# Patient Record
Sex: MALE | Race: WHITE | HISPANIC OR LATINO | URBAN - METROPOLITAN AREA
[De-identification: names, ages, dates, MRNs, and addresses within clinical notes are randomized per-mention and may not be internally consistent; named-entity substitution may affect disease eponyms.]

---

## 2024-12-20 ENCOUNTER — LAB (OUTPATIENT)
Dept: LAB | Facility: LAB | Age: 24
End: 2024-12-20
Payer: COMMERCIAL

## 2024-12-20 ENCOUNTER — APPOINTMENT (OUTPATIENT)
Dept: PRIMARY CARE | Facility: CLINIC | Age: 24
End: 2024-12-20
Payer: COMMERCIAL

## 2024-12-20 VITALS
SYSTOLIC BLOOD PRESSURE: 124 MMHG | DIASTOLIC BLOOD PRESSURE: 68 MMHG | WEIGHT: 151 LBS | HEART RATE: 71 BPM | BODY MASS INDEX: 17.47 KG/M2 | HEIGHT: 78 IN

## 2024-12-20 DIAGNOSIS — K58.0 IRRITABLE BOWEL SYNDROME WITH DIARRHEA: Primary | ICD-10-CM

## 2024-12-20 DIAGNOSIS — Z23 NEED FOR INFLUENZA VACCINATION: ICD-10-CM

## 2024-12-20 DIAGNOSIS — R29.91 MARFANOID HABITUS: ICD-10-CM

## 2024-12-20 PROBLEM — R06.02 SHORTNESS OF BREATH: Status: RESOLVED | Noted: 2024-12-20 | Resolved: 2024-12-20

## 2024-12-20 PROBLEM — R05.9 COUGH: Status: ACTIVE | Noted: 2024-12-20

## 2024-12-20 PROBLEM — J93.9 PNEUMOTHORAX: Status: RESOLVED | Noted: 2022-02-22 | Resolved: 2024-12-20

## 2024-12-20 PROBLEM — R06.02 SHORTNESS OF BREATH: Status: ACTIVE | Noted: 2024-12-20

## 2024-12-20 PROBLEM — R05.9 COUGH: Status: RESOLVED | Noted: 2024-12-20 | Resolved: 2024-12-20

## 2024-12-20 PROBLEM — L98.9 SCALP LESION: Status: RESOLVED | Noted: 2024-12-20 | Resolved: 2024-12-20

## 2024-12-20 PROBLEM — R07.1 CHEST PAIN ON BREATHING: Status: RESOLVED | Noted: 2024-12-20 | Resolved: 2024-12-20

## 2024-12-20 PROBLEM — J93.9 PNEUMOTHORAX: Status: ACTIVE | Noted: 2022-02-22

## 2024-12-20 PROBLEM — R07.1 CHEST PAIN ON BREATHING: Status: ACTIVE | Noted: 2024-12-20

## 2024-12-20 PROBLEM — L98.9 SCALP LESION: Status: ACTIVE | Noted: 2024-12-20

## 2024-12-20 PROCEDURE — 90471 IMMUNIZATION ADMIN: CPT | Performed by: FAMILY MEDICINE

## 2024-12-20 PROCEDURE — 90656 IIV3 VACC NO PRSV 0.5 ML IM: CPT | Performed by: FAMILY MEDICINE

## 2024-12-20 PROCEDURE — 99214 OFFICE O/P EST MOD 30 MIN: CPT | Performed by: FAMILY MEDICINE

## 2024-12-20 PROCEDURE — 3008F BODY MASS INDEX DOCD: CPT | Performed by: FAMILY MEDICINE

## 2024-12-20 PROCEDURE — 1036F TOBACCO NON-USER: CPT | Performed by: FAMILY MEDICINE

## 2024-12-20 RX ORDER — ALBUTEROL SULFATE 90 UG/1
INHALANT RESPIRATORY (INHALATION)
COMMUNITY
Start: 2021-11-18

## 2024-12-20 RX ORDER — ACETAMINOPHEN 500 MG
TABLET ORAL
COMMUNITY

## 2024-12-20 ASSESSMENT — PATIENT HEALTH QUESTIONNAIRE - PHQ9
2. FEELING DOWN, DEPRESSED OR HOPELESS: NOT AT ALL
SUM OF ALL RESPONSES TO PHQ9 QUESTIONS 1 AND 2: 0
1. LITTLE INTEREST OR PLEASURE IN DOING THINGS: NOT AT ALL

## 2024-12-20 NOTE — PROGRESS NOTES
Subjective   Patient ID: David Metzger is a 24 y.o. male who presents for Abdominal Pain.    Past Medical, Surgical, and Family History reviewed and updated in chart.    Reviewed all medications by prescribing practitioner or clinical pharmacist (such as prescriptions, OTCs, herbal therapies and supplements) and documented in the medical record.    HPI  1. Abdominal Pain    David is presenting with intermittent abdominal pain that he has experienced over the past year. He reports increased gassiness compared to previous years, initially attributing it to his diet. However, eliminating certain foods has not alleviated his symptoms. He also experiences occasional diarrhea, which has become more frequent over the past year. He denies any black or bloody stool. David often feels bloated but has not been able to identify any specific triggers. He notes a family history of irritable bowel syndrome (IBS) and Crohn's disease through his sister. David is currently a college student.    2. Marfanoid Habitus    David and his mother are concerned about the possibility of Marfan syndrome. He mentions that he is significantly taller than other family members. He has a history of a pneumothorax that occurred just under three years ago. Additionally, David reports difficulty gaining weight and muscle mass, which he finds quite bothersome.    Review of Systems  All pertinent positive symptoms are included in the history of present illness.    All other systems have been reviewed and are negative and noncontributory to this patient's current ailments.    Past Medical History:   Diagnosis Date    Other pneumothorax 06/21/2022    Spontaneous pneumothorax    Other pulmonary collapse     Collapsed lung     Past Surgical History:   Procedure Laterality Date    OTHER SURGICAL HISTORY  06/22/2022    Tonsillectomy     Social History     Tobacco Use    Smoking status: Never    Smokeless tobacco: Never     No family history on  "file.  Immunization History   Administered Date(s) Administered    Flu vaccine, quadrivalent, no egg protein, age 6 month or greater (FLUCELVAX) 01/29/2020    Flu vaccine, trivalent, preservative free, age 6 months and greater (Fluarix/Fluzone/Flulaval) 12/20/2024    MMR and varicella combined vaccine, subcutaneous (PROQUAD) 11/27/2007    Moderna SARS-CoV-2 Vaccination 06/29/2021, 07/27/2021    Tdap vaccine, age 7 year and older (BOOSTRIX, ADACEL) 09/15/2015    Varicella vaccine, subcutaneous (VARIVAX) 04/11/2005     Current Outpatient Medications   Medication Instructions    albuterol (Ventolin HFA) 90 mcg/actuation inhaler Inhale.    cholecalciferol (Vitamin D-3) 50 mcg (2,000 unit) capsule oral    rifAXIMin (XIFAXAN) 550 mg, oral, 3 times daily     Allergies   Allergen Reactions    Sulfa (Sulfonamide Antibiotics) Rash     Mom wanted it added because she and daughter have allergy.       Objective   Vitals:    12/20/24 1042   BP: 124/68   Pulse: 71   Weight: 68.5 kg (151 lb)   Height: 1.981 m (6' 6\")     Body mass index is 17.45 kg/m².    BP Readings from Last 3 Encounters:   12/20/24 124/68   07/20/22 121/81   06/21/22 110/70      Wt Readings from Last 3 Encounters:   12/20/24 68.5 kg (151 lb)   07/20/22 72.6 kg (160 lb)   06/21/22 71.2 kg (157 lb) (52%, Z= 0.06)*     * Growth percentiles are based on CDC (Boys, 2-20 Years) data.      Physical Exam  CONSTITUTIONAL - well nourished, well developed, looks like stated age, in no acute distress, not ill-appearing, and not tired appearing  SKIN - normal skin color and pigmentation, normal skin turgor without rash, lesions, or nodules visualized  HEAD - no trauma, normocephalic  CHEST - clear to auscultation, no wheezing, no crackles and no rales, good effort  CARDIAC - regular rate and regular rhythm, no skipped beats, no murmur  ABDOMEN - no organomegaly, soft, nontender, nondistended, normal bowel sounds, no guarding/rebound/rigidity, negative McBurney sign and " negative Melissa sign  EXTREMITIES - disproportionately long arms, legs, and fingers  NEUROLOGICAL - normal gait, normal balance, normal motor, no ataxia, DTRs equal and symmetrical; alert, oriented and no focal signs  PSYCHIATRIC - alert, pleasant and cordial, age-appropriate    Assessment/Plan   Problem List Items Addressed This Visit       Marfanoid habitus     Due to you history of pneumothorax and body habitus, we ordered an Aortopathy Comprehensive Panel to get some genetic tests done, specifically FBN1 gene to test for Marfans syndrome    If you test positive, an echocardiogram is warranted         Relevant Orders    Aortopathy Comprehensive Panel    Irritable bowel syndrome with diarrhea - Primary     We're going to give you a trial of Xifaxan for 2 weeks, hoping to help eliminate or at least decrease the symptoms of your abdominal discomfort/bloating and loose stool which I suspect to be IBS    There is a family history of Crohn's disease, so we may have to consider a GI consult with colonoscopy if there is no improvement    Risks, benefits, and options of treatment(s) were discussed after reviewing all current medication(s) and drug allergy(ies)  I opted for the treatment that we discussed with instructions on the medication use for your underlying medical ailment(s)         Relevant Medications    rifAXIMin (Xifaxan) 550 mg tablet     Other Visit Diagnoses       Need for influenza vaccination        All questions were answered and you were counseled on immunization(s) in detail and vaccination was provided    Relevant Orders    Flu vaccine, trivalent, preservative free, age 6 months and greater (Fluraix/Fluzone/Flulaval) (Completed)

## 2024-12-20 NOTE — ASSESSMENT & PLAN NOTE
Due to you history of pneumothorax and body habitus, we ordered an Aortopathy Comprehensive Panel to get some genetic tests done, specifically FBN1 gene to test for Marfans syndrome    If you test positive, an echocardiogram is warranted

## 2024-12-20 NOTE — ASSESSMENT & PLAN NOTE
We're going to give you a trial of Xifaxan for 2 weeks, hoping to help eliminate or at least decrease the symptoms of your abdominal discomfort/bloating and loose stool which I suspect to be IBS    There is a family history of Crohn's disease, so we may have to consider a GI consult with colonoscopy if there is no improvement    Risks, benefits, and options of treatment(s) were discussed after reviewing all current medication(s) and drug allergy(ies)  I opted for the treatment that we discussed with instructions on the medication use for your underlying medical ailment(s)

## 2024-12-30 LAB
COMMENTS - MP RESULT TYPE: NORMAL
SCAN RESULT: NORMAL

## 2024-12-31 DIAGNOSIS — R29.91 MARFANOID HABITUS: Primary | ICD-10-CM

## 2025-01-16 ENCOUNTER — HOSPITAL ENCOUNTER (OUTPATIENT)
Dept: CARDIOLOGY | Facility: CLINIC | Age: 25
Discharge: HOME | End: 2025-01-16
Payer: COMMERCIAL

## 2025-01-16 DIAGNOSIS — R29.91 MARFANOID HABITUS: ICD-10-CM

## 2025-01-16 DIAGNOSIS — I71.20 THORACIC AORTIC ANEURYSM, WITHOUT RUPTURE, UNSPECIFIED (CMS-HCC): ICD-10-CM

## 2025-01-16 LAB
AORTIC VALVE PEAK VELOCITY: 1.09 M/S
AV PEAK GRADIENT: 5 MMHG
AVA (PEAK VEL): 2.56 CM2
EJECTION FRACTION APICAL 4 CHAMBER: 53.3
EJECTION FRACTION: 62 %
LEFT ATRIUM VOLUME AREA LENGTH INDEX BSA: 9.9 ML/M2
LEFT VENTRICLE INTERNAL DIMENSION DIASTOLE: 4.31 CM (ref 3.5–6)
LEFT VENTRICULAR OUTFLOW TRACT DIAMETER: 1.89 CM
MITRAL VALVE E/A RATIO: 2.85
RIGHT VENTRICLE FREE WALL PEAK S': 14 CM/S
RIGHT VENTRICLE PEAK SYSTOLIC PRESSURE: 14 MMHG
TRICUSPID ANNULAR PLANE SYSTOLIC EXCURSION: 2 CM

## 2025-01-16 PROCEDURE — 93306 TTE W/DOPPLER COMPLETE: CPT | Performed by: INTERNAL MEDICINE

## 2025-01-16 PROCEDURE — 93306 TTE W/DOPPLER COMPLETE: CPT

## 2025-01-17 NOTE — RESULT ENCOUNTER NOTE
The echocardiogram for David reveals normal left and right ventricular systolic function, with a left ventricular ejection fraction of 62% (very good). All cardiac chambers and valves are structurally normal, with no significant regurgitation, except for trace tricuspid regurgitation and physiologic pulmonic valve regurgitation. The aortic root and ascending aorta are normal, with an ascending aortic diameter of 3.02 cm.